# Patient Record
Sex: FEMALE | Race: WHITE | Employment: FULL TIME | URBAN - METROPOLITAN AREA
[De-identification: names, ages, dates, MRNs, and addresses within clinical notes are randomized per-mention and may not be internally consistent; named-entity substitution may affect disease eponyms.]

---

## 2017-05-28 ENCOUNTER — HOSPITAL ENCOUNTER (OUTPATIENT)
Age: 52
Setting detail: OBSERVATION
Discharge: HOME | End: 2017-05-30
Attending: EMERGENCY MEDICINE | Admitting: INTERNAL MEDICINE
Payer: COMMERCIAL

## 2017-05-28 ENCOUNTER — APPOINTMENT (OUTPATIENT)
Dept: CT IMAGING | Age: 52
End: 2017-05-28
Attending: PHYSICIAN ASSISTANT
Payer: COMMERCIAL

## 2017-05-28 DIAGNOSIS — T50.905A ADVERSE DRUG REACTION, INITIAL ENCOUNTER: ICD-10-CM

## 2017-05-28 DIAGNOSIS — N30.01 ACUTE CYSTITIS WITH HEMATURIA: ICD-10-CM

## 2017-05-28 DIAGNOSIS — F41.1 ANXIETY STATE: ICD-10-CM

## 2017-05-28 DIAGNOSIS — I95.9 HYPOTENSION, UNSPECIFIED HYPOTENSION TYPE: Primary | ICD-10-CM

## 2017-05-28 DIAGNOSIS — R10.84 ABDOMINAL PAIN, GENERALIZED: ICD-10-CM

## 2017-05-28 DIAGNOSIS — R07.89 OTHER CHEST PAIN: ICD-10-CM

## 2017-05-28 PROBLEM — N12 PYELONEPHRITIS: Status: ACTIVE | Noted: 2017-05-28

## 2017-05-28 LAB
ALBUMIN SERPL BCP-MCNC: 2.7 G/DL (ref 3.5–5)
ALBUMIN/GLOB SERPL: 0.7 {RATIO} (ref 1.1–2.2)
ALP SERPL-CCNC: 65 U/L (ref 45–117)
ALT SERPL-CCNC: 21 U/L (ref 12–78)
ANION GAP BLD CALC-SCNC: 10 MMOL/L (ref 5–15)
APPEARANCE UR: ABNORMAL
AST SERPL W P-5'-P-CCNC: 17 U/L (ref 15–37)
BACTERIA URNS QL MICRO: ABNORMAL /HPF
BASOPHILS # BLD AUTO: 0 K/UL (ref 0–0.1)
BASOPHILS # BLD: 0 % (ref 0–1)
BILIRUB SERPL-MCNC: 0.4 MG/DL (ref 0.2–1)
BILIRUB UR QL: NEGATIVE
BUN SERPL-MCNC: 19 MG/DL (ref 6–20)
BUN/CREAT SERPL: 14 (ref 12–20)
CALCIUM SERPL-MCNC: 7.6 MG/DL (ref 8.5–10.1)
CHLORIDE SERPL-SCNC: 101 MMOL/L (ref 97–108)
CK SERPL-CCNC: 22 U/L (ref 26–192)
CO2 SERPL-SCNC: 27 MMOL/L (ref 21–32)
COLOR UR: ABNORMAL
CREAT SERPL-MCNC: 1.36 MG/DL (ref 0.55–1.02)
EOSINOPHIL # BLD: 0 K/UL (ref 0–0.4)
EOSINOPHIL NFR BLD: 0 % (ref 0–7)
EPITH CASTS URNS QL MICRO: ABNORMAL /LPF
ERYTHROCYTE [DISTWIDTH] IN BLOOD BY AUTOMATED COUNT: 13.2 % (ref 11.5–14.5)
GLOBULIN SER CALC-MCNC: 4 G/DL (ref 2–4)
GLUCOSE SERPL-MCNC: 105 MG/DL (ref 65–100)
GLUCOSE UR STRIP.AUTO-MCNC: NEGATIVE MG/DL
HCT VFR BLD AUTO: 34 % (ref 35–47)
HGB BLD-MCNC: 11.4 G/DL (ref 11.5–16)
HGB UR QL STRIP: ABNORMAL
KETONES UR QL STRIP.AUTO: 15 MG/DL
LACTATE BLD-SCNC: 1.7 MMOL/L (ref 0.4–2)
LEUKOCYTE ESTERASE UR QL STRIP.AUTO: ABNORMAL
LYMPHOCYTES # BLD AUTO: 10 % (ref 12–49)
LYMPHOCYTES # BLD: 1.6 K/UL (ref 0.8–3.5)
MCH RBC QN AUTO: 30.9 PG (ref 26–34)
MCHC RBC AUTO-ENTMCNC: 33.5 G/DL (ref 30–36.5)
MCV RBC AUTO: 92.1 FL (ref 80–99)
MONOCYTES # BLD: 1.5 K/UL (ref 0–1)
MONOCYTES NFR BLD AUTO: 9 % (ref 5–13)
MUCOUS THREADS URNS QL MICRO: ABNORMAL /LPF
NEUTS SEG # BLD: 13.3 K/UL (ref 1.8–8)
NEUTS SEG NFR BLD AUTO: 81 % (ref 32–75)
NITRITE UR QL STRIP.AUTO: POSITIVE
PH UR STRIP: 5.5 [PH] (ref 5–8)
PLATELET # BLD AUTO: 262 K/UL (ref 150–400)
POTASSIUM SERPL-SCNC: 3.1 MMOL/L (ref 3.5–5.1)
PROT SERPL-MCNC: 6.7 G/DL (ref 6.4–8.2)
PROT UR STRIP-MCNC: 30 MG/DL
RBC # BLD AUTO: 3.69 M/UL (ref 3.8–5.2)
RBC #/AREA URNS HPF: ABNORMAL /HPF (ref 0–5)
SODIUM SERPL-SCNC: 138 MMOL/L (ref 136–145)
SP GR UR REFRACTOMETRY: 1.02 (ref 1–1.03)
TROPONIN I SERPL-MCNC: <0.04 NG/ML
UA: UC IF INDICATED,UAUC: ABNORMAL
UROBILINOGEN UR QL STRIP.AUTO: 0.2 EU/DL (ref 0.2–1)
WBC # BLD AUTO: 16.4 K/UL (ref 3.6–11)
WBC URNS QL MICRO: >100 /HPF (ref 0–4)

## 2017-05-28 PROCEDURE — 65660000000 HC RM CCU STEPDOWN

## 2017-05-28 PROCEDURE — 74011250636 HC RX REV CODE- 250/636: Performed by: PHYSICIAN ASSISTANT

## 2017-05-28 PROCEDURE — 74011250637 HC RX REV CODE- 250/637: Performed by: INTERNAL MEDICINE

## 2017-05-28 PROCEDURE — 83605 ASSAY OF LACTIC ACID: CPT

## 2017-05-28 PROCEDURE — 96365 THER/PROPH/DIAG IV INF INIT: CPT

## 2017-05-28 PROCEDURE — 96361 HYDRATE IV INFUSION ADD-ON: CPT

## 2017-05-28 PROCEDURE — 74011250636 HC RX REV CODE- 250/636: Performed by: EMERGENCY MEDICINE

## 2017-05-28 PROCEDURE — 87040 BLOOD CULTURE FOR BACTERIA: CPT | Performed by: EMERGENCY MEDICINE

## 2017-05-28 PROCEDURE — 74011000258 HC RX REV CODE- 258: Performed by: PHYSICIAN ASSISTANT

## 2017-05-28 PROCEDURE — 80053 COMPREHEN METABOLIC PANEL: CPT | Performed by: PHYSICIAN ASSISTANT

## 2017-05-28 PROCEDURE — 74011250636 HC RX REV CODE- 250/636: Performed by: INTERNAL MEDICINE

## 2017-05-28 PROCEDURE — 74011636320 HC RX REV CODE- 636/320: Performed by: EMERGENCY MEDICINE

## 2017-05-28 PROCEDURE — 81001 URINALYSIS AUTO W/SCOPE: CPT | Performed by: PHYSICIAN ASSISTANT

## 2017-05-28 PROCEDURE — 96372 THER/PROPH/DIAG INJ SC/IM: CPT

## 2017-05-28 PROCEDURE — 96375 TX/PRO/DX INJ NEW DRUG ADDON: CPT

## 2017-05-28 PROCEDURE — 74177 CT ABD & PELVIS W/CONTRAST: CPT

## 2017-05-28 PROCEDURE — 86757 RICKETTSIA ANTIBODY: CPT | Performed by: PHYSICIAN ASSISTANT

## 2017-05-28 PROCEDURE — 87086 URINE CULTURE/COLONY COUNT: CPT | Performed by: PHYSICIAN ASSISTANT

## 2017-05-28 PROCEDURE — 86618 LYME DISEASE ANTIBODY: CPT | Performed by: PHYSICIAN ASSISTANT

## 2017-05-28 PROCEDURE — 85025 COMPLETE CBC W/AUTO DIFF WBC: CPT | Performed by: PHYSICIAN ASSISTANT

## 2017-05-28 PROCEDURE — 36415 COLL VENOUS BLD VENIPUNCTURE: CPT | Performed by: PHYSICIAN ASSISTANT

## 2017-05-28 PROCEDURE — 93005 ELECTROCARDIOGRAM TRACING: CPT

## 2017-05-28 PROCEDURE — 99218 HC RM OBSERVATION: CPT

## 2017-05-28 PROCEDURE — 99285 EMERGENCY DEPT VISIT HI MDM: CPT

## 2017-05-28 PROCEDURE — 74011636320 HC RX REV CODE- 636/320: Performed by: PHYSICIAN ASSISTANT

## 2017-05-28 PROCEDURE — 84484 ASSAY OF TROPONIN QUANT: CPT | Performed by: PHYSICIAN ASSISTANT

## 2017-05-28 PROCEDURE — 82550 ASSAY OF CK (CPK): CPT | Performed by: PHYSICIAN ASSISTANT

## 2017-05-28 RX ORDER — ONDANSETRON 2 MG/ML
4 INJECTION INTRAMUSCULAR; INTRAVENOUS
Status: DISCONTINUED | OUTPATIENT
Start: 2017-05-28 | End: 2017-05-30 | Stop reason: HOSPADM

## 2017-05-28 RX ORDER — SODIUM CHLORIDE 0.9 % (FLUSH) 0.9 %
5-10 SYRINGE (ML) INJECTION EVERY 8 HOURS
Status: DISCONTINUED | OUTPATIENT
Start: 2017-05-28 | End: 2017-05-30 | Stop reason: HOSPADM

## 2017-05-28 RX ORDER — ONDANSETRON 2 MG/ML
4 INJECTION INTRAMUSCULAR; INTRAVENOUS
Status: COMPLETED | OUTPATIENT
Start: 2017-05-28 | End: 2017-05-28

## 2017-05-28 RX ORDER — SODIUM CHLORIDE 9 MG/ML
125 INJECTION, SOLUTION INTRAVENOUS CONTINUOUS
Status: DISCONTINUED | OUTPATIENT
Start: 2017-05-28 | End: 2017-05-30 | Stop reason: HOSPADM

## 2017-05-28 RX ORDER — DIAZEPAM 5 MG/1
10 TABLET ORAL
Status: DISCONTINUED | OUTPATIENT
Start: 2017-05-28 | End: 2017-05-30 | Stop reason: HOSPADM

## 2017-05-28 RX ORDER — OXYCODONE AND ACETAMINOPHEN 5; 325 MG/1; MG/1
1 TABLET ORAL
Status: DISCONTINUED | OUTPATIENT
Start: 2017-05-28 | End: 2017-05-30 | Stop reason: HOSPADM

## 2017-05-28 RX ORDER — HYDROMORPHONE HYDROCHLORIDE 1 MG/ML
1 INJECTION, SOLUTION INTRAMUSCULAR; INTRAVENOUS; SUBCUTANEOUS
Status: COMPLETED | OUTPATIENT
Start: 2017-05-28 | End: 2017-05-28

## 2017-05-28 RX ORDER — SODIUM CHLORIDE 9 MG/ML
50 INJECTION, SOLUTION INTRAVENOUS
Status: COMPLETED | OUTPATIENT
Start: 2017-05-28 | End: 2017-05-28

## 2017-05-28 RX ORDER — SODIUM CHLORIDE 0.9 % (FLUSH) 0.9 %
5-10 SYRINGE (ML) INJECTION AS NEEDED
Status: DISCONTINUED | OUTPATIENT
Start: 2017-05-28 | End: 2017-05-30 | Stop reason: HOSPADM

## 2017-05-28 RX ORDER — POTASSIUM CHLORIDE 20 MEQ/1
40 TABLET, EXTENDED RELEASE ORAL
Status: COMPLETED | OUTPATIENT
Start: 2017-05-28 | End: 2017-05-28

## 2017-05-28 RX ORDER — ENOXAPARIN SODIUM 100 MG/ML
40 INJECTION SUBCUTANEOUS EVERY 24 HOURS
Status: DISCONTINUED | OUTPATIENT
Start: 2017-05-28 | End: 2017-05-30 | Stop reason: HOSPADM

## 2017-05-28 RX ORDER — ACETAMINOPHEN 325 MG/1
650 TABLET ORAL
Status: DISCONTINUED | OUTPATIENT
Start: 2017-05-28 | End: 2017-05-30 | Stop reason: HOSPADM

## 2017-05-28 RX ORDER — SODIUM CHLORIDE 0.9 % (FLUSH) 0.9 %
10 SYRINGE (ML) INJECTION
Status: COMPLETED | OUTPATIENT
Start: 2017-05-28 | End: 2017-05-28

## 2017-05-28 RX ADMIN — POTASSIUM CHLORIDE 40 MEQ: 20 TABLET, EXTENDED RELEASE ORAL at 20:59

## 2017-05-28 RX ADMIN — DIATRIZOATE MEGLUMINE AND DIATRIZOATE SODIUM 30 ML: 600; 100 SOLUTION ORAL; RECTAL at 13:51

## 2017-05-28 RX ADMIN — Medication 10 ML: at 17:08

## 2017-05-28 RX ADMIN — CEFTRIAXONE 1 G: 1 INJECTION, POWDER, FOR SOLUTION INTRAMUSCULAR; INTRAVENOUS at 16:04

## 2017-05-28 RX ADMIN — IOPAMIDOL 100 ML: 755 INJECTION, SOLUTION INTRAVENOUS at 17:08

## 2017-05-28 RX ADMIN — HYDROMORPHONE HYDROCHLORIDE 1 MG: 1 INJECTION, SOLUTION INTRAMUSCULAR; INTRAVENOUS; SUBCUTANEOUS at 15:06

## 2017-05-28 RX ADMIN — ONDANSETRON HYDROCHLORIDE 4 MG: 2 INJECTION, SOLUTION INTRAMUSCULAR; INTRAVENOUS at 15:04

## 2017-05-28 RX ADMIN — SODIUM CHLORIDE 125 ML/HR: 900 INJECTION, SOLUTION INTRAVENOUS at 20:58

## 2017-05-28 RX ADMIN — Medication 10 ML: at 21:00

## 2017-05-28 RX ADMIN — SODIUM CHLORIDE 50 ML/HR: 900 INJECTION, SOLUTION INTRAVENOUS at 17:08

## 2017-05-28 RX ADMIN — SODIUM CHLORIDE 1000 ML: 900 INJECTION, SOLUTION INTRAVENOUS at 15:00

## 2017-05-28 RX ADMIN — SODIUM CHLORIDE 1000 ML: 900 INJECTION, SOLUTION INTRAVENOUS at 16:03

## 2017-05-28 NOTE — ED NOTES
Assumed care of patient. Patient placed in position of comfort. Call bell in reach. Skin warm and dry. Respirations even and unlabored. In no apparent distress at this time. Presents to the ED via EMS from [facility] Med d/t labile BP and elevated WBCs. Originally evaluated for urinary frequency, generalized body aches and malaise. Diffuse abdominal tenderness with palpation to abdomen. Upon ED arrival IVF bolus infusing, BP stable and pt reports \"feeling better. \" IV Rocephin administered pta. A&O x 4. Family at bedside.

## 2017-05-28 NOTE — ED NOTES
[id number]  Patient c/o mid-sternal chest pain ten minutes after Dilaudid IV administration, describes chest pain as intermittent \"tightness\" rated at 10/10. EKG and cardiac enzymes obtained, placed on cardiac monitor, Dr. Maggy Butt evaluated patient. Patient continued to c/o severe chest tightness, KAREN Bruner and Dr. Maggy Butt aware, patient transferred to ED room 35 for cardiac monitoring. Report given using SBAR to Sue Walker RN.

## 2017-05-28 NOTE — H&P
Hospitalist Admission Note    NAME: Otoniel Munson   :  1965   MRN:  [id number]     Date/Time:  2017 6:08 PM    Patient PCP: None  ________________________________________________________________________    My assessment of this patient's clinical condition and my plan of care is as follows. Assessment / Plan:  UTI, likely pyelonephritis  Hypotension (88/58)  -the patient received rocephin outside the hospital but this is what we will cont  -Start ivf and FU blood and urine cx  -hold home BP med  ARF vs CKD: decreased GFR  -no prior lab values, follow BMP and urine output  Anemia  -check iron studies and given the amount she drinks check b12 and folate  Hypokalemia  -replace and check Mag  Alcohol abuse  -start CIWA, she has not ever had a problem she says though not drinking for one night    Code Status:  FULL  DVT Prophylaxis: lovenox           Subjective:   CHIEF COMPLAINT: sent from [facility] urgent care center    HISTORY OF PRESENT ILLNESS:     Otoniel Munson is a 54 y.o.   female who presents with acute onset generalized body aches x 3 days. +fever of 101 yesterday. +assoc nausea and urinary frequency, no dysuria. Pt reports she went to [facility] today for these symptoms. At [facility] pt was diagnosed with an UTI and was transferred to [facility] ER because she was hypotensive. No SOB, no chest pain at home. +lack of appetite. She was taking aleve and advil at home for her symptoms. We were asked to admit for work up and evaluation of the above problems. Past Medical History:   Diagnosis Date    Hypertension         Past Surgical History:   Procedure Laterality Date    HX GI      cholecystectomy    HX GYN      tubal ligation.        Social History   Substance Use Topics    Smoking status: None, former, quit 10 yr ago    Smokeless tobacco: Not on file    Alcohol use  every day 3-4 daily beer and wine       Family Hx:  M - HTN    No Known Allergies     Prior to Admission medications    Medication Sig Start Date End Date Taking? Authorizing Provider   LISINOPRIL PO Take  by mouth. Yes Phys Other, MD       REVIEW OF SYSTEMS:     I am not able to complete the review of systems because: The patient is intubated and sedated    The patient has altered mental status due to his acute medical problems    The patient has baseline aphasia from prior stroke(s)    The patient has baseline dementia and is not reliable historian    The patient is in acute medical distress and unable to provide information           Total of 12 systems reviewed as follows:       POSITIVE= underlined text  Negative = text not underlined  General:  fever, chills, sweats, generalized weakness, weight loss/gain,      loss of appetite   Eyes:    blurred vision, eye pain, loss of vision, double vision  ENT:    rhinorrhea, pharyngitis   Respiratory:   cough, sputum production, SOB, PARKS, wheezing, pleuritic pain   Cardiology:   chest pain, palpitations, orthopnea, PND, edema, syncope   Gastrointestinal:  abdominal pain , N/V, diarrhea, dysphagia, constipation, bleeding   Genitourinary:  frequency, urgency, dysuria, hematuria, incontinence   Muskuloskeletal :  arthralgia, myalgia, back pain  Hematology:  easy bruising, nose or gum bleeding, lymphadenopathy   Dermatological: rash, ulceration, pruritis, color change / jaundice  Endocrine:   hot flashes or polydipsia   Neurological:  headache, dizziness, confusion, focal weakness, paresthesia,     Speech difficulties, memory loss, gait difficulty  Psychological: Feelings of anxiety, depression, agitation    Objective:   VITALS:    Visit Vitals    /63    Pulse 90    Temp 98 °F (36.7 °C)    Resp 18    Ht 5' 7\" (1.702 m)    Wt 71.7 kg (158 lb)    SpO2 92%    BMI 24.75 kg/m2       PHYSICAL EXAM:    General:    Alert, cooperative, no distress, appears stated age.      HEENT: Atraumatic, anicteric sclerae, pink conjunctivae       mucosa moist,   dentition fair  Neck:  Supple, symmetrical,  thyroid: non tender  Lungs:   Clear to auscultation bilaterally. No Wheezing or Rhonchi. No rales. Chest wall:   No Accessory muscle use. +cva tenerness on the L  Heart:   Regular  rhythm,    No edema  Abdomen:   Soft, non-tender. Not distended. Bowel sounds normal  Extremities: No cyanosis. No clubbing,      Skin turgor normal,  Radial dial pulse 2+  Skin:     Not pale. Not Jaundiced    Psych:  Good insight. Not depressed. Not anxious or agitated. Neurologic: EOMs intact. No facial asymmetry. No aphasia or slurred speech. Symmetrical strength, Sensation grossly intact. Alert and oriented X 4.     _______________________________________________________________________  Care Plan discussed with:    Comments   Patient x    Family      RN     Care Manager                    Consultant:      _______________________________________________________________________  Expected  Disposition:   Home with Family x   HH/PT/OT/RN    SNF/LTC    DINESH    ________________________________________________________________________  TOTAL TIME:   Minutes    Critical Care Provided     Minutes non procedure based      Comments     Reviewed previous records   >50% of visit spent in counseling and coordination of care  Discussion with patient and/or family and questions answered       ________________________________________________________________________  Signed: Ovidio Jaimes MD    Procedures: see electronic medical records for all procedures/Xrays and details which were not copied into this note but were reviewed prior to creation of Plan.     LAB DATA REVIEWED:    Recent Results (from the past 24 hour(s))   CBC WITH AUTOMATED DIFF    Collection Time: 05/28/17  1:51 PM   Result Value Ref Range    WBC 16.4 (H) 3.6 - 11.0 K/uL    RBC 3.69 (L) 3.80 - 5.20 M/uL    HGB 11.4 (L) 11.5 - 16.0 g/dL    HCT 34.0 (L) 35.0 - 47.0 %    MCV 92.1 80.0 - 99.0 FL    MCH 30.9 26.0 - 34.0 PG    MCHC 33.5 30.0 - 36.5 g/dL    RDW 13.2 11.5 - 14.5 %    PLATELET 382 699 - 280 K/uL    NEUTROPHILS 81 (H) 32 - 75 %    LYMPHOCYTES 10 (L) 12 - 49 %    MONOCYTES 9 5 - 13 %    EOSINOPHILS 0 0 - 7 %    BASOPHILS 0 0 - 1 %    ABS. NEUTROPHILS 13.3 (H) 1.8 - 8.0 K/UL    ABS. LYMPHOCYTES 1.6 0.8 - 3.5 K/UL    ABS. MONOCYTES 1.5 (H) 0.0 - 1.0 K/UL    ABS. EOSINOPHILS 0.0 0.0 - 0.4 K/UL    ABS. BASOPHILS 0.0 0.0 - 0.1 K/UL   METABOLIC PANEL, COMPREHENSIVE    Collection Time: 05/28/17  1:51 PM   Result Value Ref Range    Sodium 138 136 - 145 mmol/L    Potassium 3.1 (L) 3.5 - 5.1 mmol/L    Chloride 101 97 - 108 mmol/L    CO2 27 21 - 32 mmol/L    Anion gap 10 5 - 15 mmol/L    Glucose 105 (H) 65 - 100 mg/dL    BUN 19 6 - 20 MG/DL    Creatinine 1.36 (H) 0.55 - 1.02 MG/DL    BUN/Creatinine ratio 14 12 - 20      GFR est AA 50 (L) >60 ml/min/1.73m2    GFR est non-AA 41 (L) >60 ml/min/1.73m2    Calcium 7.6 (L) 8.5 - 10.1 MG/DL    Bilirubin, total 0.4 0.2 - 1.0 MG/DL    ALT (SGPT) 21 12 - 78 U/L    AST (SGOT) 17 15 - 37 U/L    Alk.  phosphatase 65 45 - 117 U/L    Protein, total 6.7 6.4 - 8.2 g/dL    Albumin 2.7 (L) 3.5 - 5.0 g/dL    Globulin 4.0 2.0 - 4.0 g/dL    A-G Ratio 0.7 (L) 1.1 - 2.2     URINALYSIS W/ REFLEX CULTURE    Collection Time: 05/28/17  1:52 PM   Result Value Ref Range    Color YELLOW/STRAW      Appearance CLOUDY (A) CLEAR      Specific gravity 1.020 1.003 - 1.030      pH (UA) 5.5 5.0 - 8.0      Protein 30 (A) NEG mg/dL    Glucose NEGATIVE  NEG mg/dL    Ketone 15 (A) NEG mg/dL    Bilirubin NEGATIVE  NEG      Blood MODERATE (A) NEG      Urobilinogen 0.2 0.2 - 1.0 EU/dL    Nitrites POSITIVE (A) NEG      Leukocyte Esterase LARGE (A) NEG      WBC >100 (H) 0 - 4 /hpf    RBC 10-20 0 - 5 /hpf    Epithelial cells MODERATE (A) FEW /lpf    Bacteria 2+ (A) NEG /hpf    UA:UC IF INDICATED URINE CULTURE ORDERED (A) CNI      Mucus TRACE (A) NEG /lpf   EKG, 12 LEAD, INITIAL    Collection Time: 05/28/17  3:27 PM   Result Value Ref Range    Ventricular Rate 65 BPM    Atrial Rate 65 BPM    P-R Interval 124 ms    QRS Duration 72 ms    Q-T Interval 378 ms    QTC Calculation (Bezet) 393 ms    Calculated P Axis 50 degrees    Calculated R Axis 27 degrees    Calculated T Axis -15 degrees    Diagnosis       Normal sinus rhythm  Nonspecific T wave abnormality  No previous ECGs available     TROPONIN I    Collection Time: 05/28/17  3:35 PM   Result Value Ref Range    Troponin-I, Qt. <0.04 <0.05 ng/mL   CK    Collection Time: 05/28/17  3:35 PM   Result Value Ref Range    CK 22 (L) 26 - 192 U/L   POC LACTIC ACID    Collection Time: 05/28/17  4:09 PM   Result Value Ref Range    Lactic Acid (POC) 1.7 0.4 - 2.0 mmol/L

## 2017-05-28 NOTE — PROGRESS NOTES
TRANSFER - IN REPORT:    Verbal report received from Alyssa Lacy RN(name) on Marjan Gearing  being received from ER(unit) for routine progression of care      Report consisted of patients Situation, Background, Assessment and   Recommendations(SBAR). Information from the following report(s) SBAR, Kardex, Intake/Output, MAR and Recent Results was reviewed with the receiving nurse. Opportunity for questions and clarification was provided. Assessment completed upon patients arrival to unit and care assumed.

## 2017-05-28 NOTE — IP AVS SNAPSHOT
[address] [facility] 
[phone] Patient: Loraine Perez MRN: [id number] :1965 You are allergic to the following No active allergies Recent Documentation Height Weight BMI Smoking Status 1.702 m 71.7 kg 24.75 kg/m2 Never Assessed Unresulted Labs Order Current Status LYME AB, IGM, WITH REFLEX WBLOT In process R RICKETTSII AB IGG W/REFL In process CULTURE, BLOOD Preliminary result Emergency Contacts Name Discharge Info Relation Home Work Mobile Jasvir Kelley  Spouse [3]   [phone] About your hospitalization You were admitted on:  May 28, 2017 You last received care in the:  [facility] 3 RENAL MEDICINE You were discharged on:  May 30, 2017 Unit phone number:  [phone] Why you were hospitalized Your primary diagnosis was:  Not on File Your diagnoses also included:  Pyelonephritis, Hypotension, Etoh Abuse Providers Seen During Your Hospitalizations Provider Role Specialty Primary office phone Ariane Rodriguez MD Attending Provider Emergency Medicine [phone] Alexys Patton MD Attending Provider Emergency Medicine [phone] Rose Grimm MD Attending Provider Internal Medicine [phone] Jeannette Barrow MD Attending Provider Internal Medicine [phone] Your Primary Care Physician (PCP) Primary Care Physician Office Phone Office Fax NONE ** None ** ** None ** Follow-up Information Follow up With Details Comments Contact Info Ricardo Pressley MD Schedule an appointment as soon as possible for a visit to be seen as soon as possible [address] [place], [place] [address] [place] [address] 
[phone] Current Discharge Medication List  
  
START taking these medications Dose & Instructions Dispensing Information Comments Morning Noon Evening Bedtime ciprofloxacin HCl 250 mg tablet Commonly known as:  CIPRO Your last dose was: Your next dose is:    
   
   
 Dose:  500 mg Take 2 Tabs by mouth every twelve (12) hours. Indications: UTI (noncomplicated) Quantity:  20 Tab Refills:  0 STOP taking these medications LISINOPRIL PO Where to Get Your Medications Information on where to get these meds will be given to you by the nurse or doctor. ! Ask your nurse or doctor about these medications  
  ciprofloxacin HCl 250 mg tablet Discharge Instructions Patient Discharge Instructions Pt Name  Alondra Leal Date of Birth 1965 Age  54 y.o. Medical Record Number  [id number] PCP None Admit date:  5/28/2017 @    [place] [address] Room Number  5535/08 Date of Discharge 5/30/2017 Admission Diagnoses:     <principal problem not specified> No Known Allergies You were admitted to [address] for  <principal problem not specified> YOUR OTHER MEDICAL DIAGNOSES INCLUDE (BUT NOT LIMITED TO ): 
Present on Admission:  Pyelonephritis  Hypotension  ETOH abuse DIET:  Regular Diet Recommended activity: Activity as tolerated Follow up : Follow-up Information Follow up With Details Comments Contact Info Radha Cordero MD Schedule an appointment as soon as possible for a visit to be seen as soon as possible [address] [place] [address] 
[phone] Please follow up with your urologist as soon as possible. · It is important that you take the medication exactly as they are prescribed. · Keep your medication in the bottles provided by the pharmacist and keep a list of the medication names, dosages, and times to be taken in your wallet. · Do not take other medications without consulting your doctor. ADDITIONAL INFORMATION: If you experience any of the following symptoms or have any health problem not listed below, then please call your primary care physician or return to the emergency room if you cannot get hold of your doctor: Fever, chills, nausea, vomiting, diarrhea, change in mentation, falling, bleeding, shortness of breath. I understand that if any problems occur once I am discharged, I am supposed to call my Primary care physician for further care or seek help in the Emergency Department at the nearest Healthcare facility. I have had an opportunity to discuss my clinical issues with my doctor and nursing staff. I understand and acknowledge receipt of the above instructions. Physician's or R.N.'s Signature                                                            Date/Time Patient or Representative Signature                                                 Date/Time Kidney Infection: Care Instructions Your Care Instructions A kidney infection (pyelonephritis) is a type of urinary tract infection, or UTI. Most UTIs are bladder infections. Kidney infections tend to make people much sicker than bladder infections do. A kidney infection is also more serious because it can cause lasting damage if it is not treated quickly. Follow-up care is a key part of your treatment and safety. Be sure to make and go to all appointments, and call your doctor if you are having problems. It's also a good idea to know your test results and keep a list of the medicines you take. How can you care for yourself at home? · Take your antibiotics as directed.  Do not stop taking them just because you feel better. You need to take the full course of antibiotics. · Drink plenty of water, enough so that your urine is light yellow or clear like water. This may help wash out bacteria that are causing the infection. If you have kidney, heart, or liver disease and have to limit fluids, talk with your doctor before you increase the amount of fluids you drink. · Urinate often. Try to empty your bladder each time. · To relieve pain, take a hot shower or lay a heating pad (set on low) over your lower belly. Never go to sleep with a heating pad in place. Put a thin cloth between the heating pad and your skin. To help prevent kidney infections · Drink plenty of water each day. This helps you urinate often, which clears bacteria from your system. If you have kidney, heart, or liver disease and have to limit fluids, talk with your doctor before you increase the amount of fluids you drink. · Urinate when you have the urge. Do not hold your urine for a long time. Urinate before you go to sleep. · If you have symptoms of a bladder infection, such as burning when you urinate or having to urinate often, call your doctor so you can treat the problem before it gets worse. If you do not treat a bladder infection quickly, it can spread to the kidney. · Men should keep the tip of the penis clean. If you are a woman, keep these ideas in mind: · Urinate right after you have sex. · Change sanitary pads often. Avoid douches, feminine hygiene sprays, and other feminine hygiene products that have deodorants. · After going to the bathroom, wipe from front to back. When should you call for help? Call your doctor now or seek immediate medical care if: 
· You have increasing pain in your back just below the rib cage. This is called flank pain. · You have a new or higher fever and chills. · You are vomiting or nauseated. Watch closely for changes in your health, and be sure to contact your doctor if: · Symptoms, such as burning when you urinate, get worse or get better but then come back. · You are not getting better after 2 days. Where can you learn more? Go to http://roshni-shlomo.info/. Enter Z706 in the search box to learn more about \"Kidney Infection: Care Instructions. \" Current as of: November 28, 2016 Content Version: 11.2 © 5240-3802 iZ3D. Care instructions adapted under license by [employer] (which disclaims liability or warranty for this information). If you have questions about a medical condition or this instruction, always ask your healthcare professional. [place] [address] any warranty or liability for your use of this information. Discharge Orders None Derby[employer]pot Announcement We are excited to announce that we are making your provider's discharge notes available to you in DerbyJackpot. You will see these notes when they are completed and signed by the physician that discharged you from your recent hospital stay. If you have any questions or concerns about any information you see in DerbyJackpot, please call the Health Information Department where you were seen or reach out to your Primary Care Provider for more information about your plan of care. Introducing [facility]! Garth Kasandrarory introduces DerbyJackpot patient portal. Now you can access parts of your medical record, email your doctor's office, and request medication refills online. 1. In your internet browser, go to [email or web address] 2. Click on the First Time User? Click Here link in the Sign In box. You will see the New Member Sign Up page. 3. Enter your DerbyJackpot Access Code exactly as it appears below. You will not need to use this code after youve completed the sign-up process. If you do not sign up before the expiration date, you must request a new code. · DerbyJackpot Access Code: [email or web address] Expires: 8/27/2017 12:52 PM 
 
4. Enter the last four digits of your Social Security Number (xxxx) and Date of Birth (mm/dd/yyyy) as indicated and click Submit. You will be taken to the next sign-up page. 5. Create a Planana ID. This will be your Planana login ID and cannot be changed, so think of one that is secure and easy to remember. 6. Create a Planana password. You can change your password at any time. 7. Enter your Password Reset Question and Answer. This can be used at a later time if you forget your password. 8. Enter your e-mail address. You will receive e-mail notification when new information is available in [address] 9. Click Sign Up. You can now view and download portions of your medical record. 10. Click the Download Summary menu link to download a portable copy of your medical information. If you have questions, please visit the Frequently Asked Questions section of the Planana website. Remember, Planana is NOT to be used for urgent needs. For medical emergencies, dial 911. Now available from your iPhone and Android! General Information Please provide this summary of care documentation to your next provider. Patient Signature:  ____________________________________________________________ Date:  ____________________________________________________________  
  
Enma Garcia Provider Signature:  ____________________________________________________________ Date:  ____________________________________________________________

## 2017-05-28 NOTE — ED NOTES
Received a call from [address] [place] [address]. [facility] states that patient had chest pain and was refusing to move onto the CT table. Compa cazares.

## 2017-05-28 NOTE — ED PROVIDER NOTES
HPI Comments: Alondra Leal is a 54 y.o. female with significant PMHx of hypertension, tubal ligation, and cholecystectomy, presents via EMS to the ED with c/o acute onset generalized body aches x 3 days. Pt notes associated symptoms of fever of \"101\" yesterday, nausea, and urinary frequency. Pt reports she went to [facility] today for these symptoms. At [facility] pt was diagnosed with an UTI and was transferred to [facility] ER because she was hypotensive. Pt notes while she was at [facility] she had lab work done with findings of WBC of 16, Potassium of 3.2, Flu A/B negative, urine showed small amount of blood, and a normal chest/abdomen xray. Per EMS pt was afebrile, received IVF bolus, and Rocephin PTA. EMS reports pt is scheduled for surgery this week. Pt states she is currently not in any pain at this time, per family pt had 2 Advils today 0800. She notes tobacco use and occasional alcohol use. Pt denies rash, vomiting, diarrhea, dysuria, abdominal pain, back pain, vaginal pain, recent sick contact, cough, illicit drug use, or taking her blood pressure medication today. PCP: None    There are no other complaints, changes or physical findings at this time. Written by Arvin Huang ED Scribanaly, as dictated by DONNA Bautista      The history is provided by the patient, the EMS personnel and a relative. No  was used. Past Medical History:   Diagnosis Date    Hypertension        Past Surgical History:   Procedure Laterality Date    HX GI      cholecystectomy    HX GYN      tubal ligation. History reviewed. No pertinent family history. Social History     Social History    Marital status:      Spouse name: N/A    Number of children: N/A    Years of education: N/A     Occupational History    Not on file.      Social History Main Topics    Smoking status: Not on file    Smokeless tobacco: Not on file    Alcohol use Not on file    Drug use: Not on file  Sexual activity: Not on file     Other Topics Concern    Not on file     Social History Narrative    No narrative on file         ALLERGIES: Review of patient's allergies indicates not on file. Review of Systems   Constitutional: Positive for fever (\"101\"). Negative for activity change, appetite change, chills, fatigue and unexpected weight change. HENT: Negative. Negative for congestion, hearing loss, rhinorrhea, sneezing and voice change. Eyes: Negative. Negative for pain and visual disturbance. Respiratory: Negative. Negative for apnea, cough, choking, chest tightness and shortness of breath. Cardiovascular: Negative. Negative for chest pain and palpitations. Gastrointestinal: Positive for nausea. Negative for abdominal distention, abdominal pain, blood in stool, diarrhea and vomiting. Genitourinary: Positive for frequency. Negative for difficulty urinating, flank pain, urgency and vaginal pain. No discharge   Musculoskeletal: Positive for myalgias (generalized body aches). Negative for arthralgias, back pain and neck stiffness. Skin: Negative. Negative for color change and rash. Neurological: Negative. Negative for dizziness, seizures, syncope, speech difficulty, weakness, numbness and headaches. Hematological: Negative for adenopathy. Psychiatric/Behavioral: Negative. Negative for agitation, behavioral problems, dysphoric mood and suicidal ideas. The patient is not nervous/anxious. Patient Vitals for the past 12 hrs:   Temp Pulse Resp BP SpO2   05/28/17 1715 - 90 18 106/63 92 %   05/28/17 1600 - 88 16 104/70 100 %   05/28/17 1541 - 65 14 (!) 88/58 100 %   05/28/17 1324 98 °F (36.7 °C) 85 11 130/63 98 %       Physical Exam   Constitutional: She is oriented to person, place, and time. She appears well-developed and well-nourished. No distress. HENT:   Head: Normocephalic and atraumatic.    Right Ear: External ear normal.   Left Ear: External ear normal.   Nose: Nose normal.   Mouth/Throat: Oropharynx is clear and moist. No oropharyngeal exudate. Eyes: Conjunctivae and EOM are normal. Pupils are equal, round, and reactive to light. Right eye exhibits no discharge. Left eye exhibits no discharge. No scleral icterus. Neck: Normal range of motion. Neck supple. No JVD present. No tracheal deviation present. Cardiovascular: Normal rate, regular rhythm, normal heart sounds and intact distal pulses. Exam reveals no gallop and no friction rub. No murmur heard. Pulmonary/Chest: Effort normal and breath sounds normal. No respiratory distress. She has no wheezes. She has no rales. She exhibits no tenderness. Abdominal: Soft. Bowel sounds are normal. She exhibits no distension and no mass. There is tenderness (diffuse). There is no rigidity, no rebound and no guarding. Musculoskeletal: Normal range of motion. She exhibits no edema or tenderness. Lymphadenopathy:     She has no cervical adenopathy. Neurological: She is alert and oriented to person, place, and time. She has normal reflexes. No cranial nerve deficit. She exhibits normal muscle tone. Coordination normal.   Skin: Skin is warm and dry. She is not diaphoretic. Psychiatric: She has a normal mood and affect. Her behavior is normal. Judgment and thought content normal.   Nursing note and vitals reviewed. MDM  Number of Diagnoses or Management Options  Abdominal pain, generalized:   Acute cystitis with hematuria: Adverse drug reaction, initial encounter:   Anxiety state:   Hypotension, unspecified hypotension type:    Other chest pain:   Diagnosis management comments:       DDx: appendicitis, UTI, pyelonephritis         Amount and/or Complexity of Data Reviewed  Clinical lab tests: ordered and reviewed  Tests in the radiology section of CPT®: ordered and reviewed  Obtain history from someone other than the patient: yes (EMS, Family)  Discuss the patient with other providers: yes (Hospitalist )    Critical Care  Total time providing critical care:  minutes    ED Course       Procedures    Progress Note:  1:19 PM  Family requested pt be checked for lyme and [facility] because she is outside all the time. Written by Lorre Severin, ED Scribe, as dictated by Destini Gomes. Progress Note:  1:20 PM  Pt was offered pain medications, however she declined. Written by Lorre Severin, ED Scribe, as dictated by Destini Gomes. Progress Note:  3:25 PM  Pt reports she has chest pain. Will obtain EKG. EKG interpretation: (Preliminary) 1527  Rhythm: normal sinus rhythm; and regular . Rate (approx.): 65; Axis: normal; WV interval: normal; QRS interval: normal ; ST/T wave: non-specific T wave changes; Other findings: normal.  Written by Lorre Severin, ED Scribe, as dictated by Destini Gomes. Progress Note:  4:15 PM  Nurse informed pt's lactic was 1.7. Pt has a negative troponin. Written by Lorre Severin, ED Scribe, as dictated by Destini Gomes. Progress Note:  4:45 PM  Tiago Hall MD is taking over care for this pt at this time. Written by Lorre Severin, ED Scribe, as dictated by Destini Gomes. CRITICAL CARE NOTE :    4:50 PM      IMPENDING DETERIORATION -Cardiovascular, CNS and Renal    ASSOCIATED RISK FACTORS - Hypotension and Shock    MANAGEMENT- Bedside Assessment and Supervision of Care    INTERPRETATION -  Xrays, CT Scan, ECG and Blood Pressure    INTERVENTIONS - hemodynamic mngmt    CASE REVIEW - Nursing and Family    TREATMENT RESPONSE -Stable    PERFORMED BY - Self and Attending Physician         NOTES   :      I have spent 75 minutes of critical care time involved in lab review, consultations with specialist, family decision- making, bedside attention and documentation. During this entire length of time I was immediately available to the patient .     DONNA Singletary      This note is prepared by [employer] Reese, acting as Scribe for [employer] DONNA England : The scribe's documentation has been prepared under my direction and personally reviewed by me in its entirety. I confirm that the note above accurately reflects all work, treatment, procedures, and medical decision making performed by me.    5:29 PM  Given  UTI and L sided pyelonephritis in septic pt with only 1 kidney who now had a bumped creatinine, will admit for IV abx. Written by Jorge Grady ED Scribe as dictated by Zoe Ruffin. Amy Slade MD    CONSULT NOTE:   5:36 PM  Zoe Ruffin. Amy Slade MD spoke with Dr. John Salas,   Specialty: Hospitalist  Discussed pt's hx, disposition, and available diagnostic and imaging results. Reviewed care plans. Consultant will admit. Written by Jorge Grady, ED Scribe, as dictated by Zoe Ruffin. Amy Slade MD.    LABORATORY TESTS:  Recent Results (from the past 12 hour(s))   CBC WITH AUTOMATED DIFF    Collection Time: 05/28/17  1:51 PM   Result Value Ref Range    WBC 16.4 (H) 3.6 - 11.0 K/uL    RBC 3.69 (L) 3.80 - 5.20 M/uL    HGB 11.4 (L) 11.5 - 16.0 g/dL    HCT 34.0 (L) 35.0 - 47.0 %    MCV 92.1 80.0 - 99.0 FL    MCH 30.9 26.0 - 34.0 PG    MCHC 33.5 30.0 - 36.5 g/dL    RDW 13.2 11.5 - 14.5 %    PLATELET 998 309 - 106 K/uL    NEUTROPHILS 81 (H) 32 - 75 %    LYMPHOCYTES 10 (L) 12 - 49 %    MONOCYTES 9 5 - 13 %    EOSINOPHILS 0 0 - 7 %    BASOPHILS 0 0 - 1 %    ABS. NEUTROPHILS 13.3 (H) 1.8 - 8.0 K/UL    ABS. LYMPHOCYTES 1.6 0.8 - 3.5 K/UL    ABS. MONOCYTES 1.5 (H) 0.0 - 1.0 K/UL    ABS. EOSINOPHILS 0.0 0.0 - 0.4 K/UL    ABS.  BASOPHILS 0.0 0.0 - 0.1 K/UL   METABOLIC PANEL, COMPREHENSIVE    Collection Time: 05/28/17  1:51 PM   Result Value Ref Range    Sodium 138 136 - 145 mmol/L    Potassium 3.1 (L) 3.5 - 5.1 mmol/L    Chloride 101 97 - 108 mmol/L    CO2 27 21 - 32 mmol/L    Anion gap 10 5 - 15 mmol/L    Glucose 105 (H) 65 - 100 mg/dL    BUN 19 6 - 20 MG/DL    Creatinine 1.36 (H) 0.55 - 1.02 MG/DL    BUN/Creatinine ratio 14 12 - 20      GFR est AA 50 (L) >60 ml/min/1.73m2    GFR est non-AA 41 (L) >60 ml/min/1.73m2    Calcium 7.6 (L) 8.5 - 10.1 MG/DL    Bilirubin, total 0.4 0.2 - 1.0 MG/DL    ALT (SGPT) 21 12 - 78 U/L    AST (SGOT) 17 15 - 37 U/L    Alk.  phosphatase 65 45 - 117 U/L    Protein, total 6.7 6.4 - 8.2 g/dL    Albumin 2.7 (L) 3.5 - 5.0 g/dL    Globulin 4.0 2.0 - 4.0 g/dL    A-G Ratio 0.7 (L) 1.1 - 2.2     URINALYSIS W/ REFLEX CULTURE    Collection Time: 05/28/17  1:52 PM   Result Value Ref Range    Color YELLOW/STRAW      Appearance CLOUDY (A) CLEAR      Specific gravity 1.020 1.003 - 1.030      pH (UA) 5.5 5.0 - 8.0      Protein 30 (A) NEG mg/dL    Glucose NEGATIVE  NEG mg/dL    Ketone 15 (A) NEG mg/dL    Bilirubin NEGATIVE  NEG      Blood MODERATE (A) NEG      Urobilinogen 0.2 0.2 - 1.0 EU/dL    Nitrites POSITIVE (A) NEG      Leukocyte Esterase LARGE (A) NEG      WBC >100 (H) 0 - 4 /hpf    RBC 10-20 0 - 5 /hpf    Epithelial cells MODERATE (A) FEW /lpf    Bacteria 2+ (A) NEG /hpf    UA:UC IF INDICATED URINE CULTURE ORDERED (A) CNI      Mucus TRACE (A) NEG /lpf   EKG, 12 LEAD, INITIAL    Collection Time: 05/28/17  3:27 PM   Result Value Ref Range    Ventricular Rate 65 BPM    Atrial Rate 65 BPM    P-R Interval 124 ms    QRS Duration 72 ms    Q-T Interval 378 ms    QTC Calculation (Bezet) 393 ms    Calculated P Axis 50 degrees    Calculated R Axis 27 degrees    Calculated T Axis -15 degrees    Diagnosis       Normal sinus rhythm  Nonspecific T wave abnormality  No previous ECGs available     TROPONIN I    Collection Time: 05/28/17  3:35 PM   Result Value Ref Range    Troponin-I, Qt. <0.04 <0.05 ng/mL   CK    Collection Time: 05/28/17  3:35 PM   Result Value Ref Range    CK 22 (L) 26 - 192 U/L   POC LACTIC ACID    Collection Time: 05/28/17  4:09 PM   Result Value Ref Range    Lactic Acid (POC) 1.7 0.4 - 2.0 mmol/L       IMAGING RESULTS:  CT Results  (Last 48 hours)               05/28/17 0772 CT ABD PELV W CONT Final result    Impression:  IMPRESSION:    1. Possible left-sided pyelonephritis   2. Right renal atrophy. Narrative:  EXAM:  CT ABDOMEN PELVIS WITH CONTRAST   INDICATION:  sent from [facility] abd. pain elevate wbcs   COMPARISON: None. .   TECHNIQUE:    Multislice helical CT was performed from the diaphragm to the symphysis pubis   with oral and intravenous contrast administration. Contiguous 5 mm axial images   were reconstructed and lung and soft tissue windows were generated. Coronal and   sagittal reformations were generated. CT dose reduction was achieved through use of a standardized protocol tailored   for this examination and automatic exposure control for dose modulation. Canary Malady FINDINGS:   INCIDENTALLY IMAGED CHEST:   Heart/vessels: Within normal limits. Lungs/Pleura: Small, 0.5 cm groundglass nodule in the left lung base. .   ABDOMEN:   Liver: Within normal limits. Gallbladder/Biliary: Status post cholecystectomy. Spleen: Within normal limits. Pancreas: Within normal limits. Adrenals: Within normal limits. Kidneys: Right renal atrophy. Punctate calculus in the lower pole of the right   kidney. Small areas of more normal-appearing enhancement medially in the right   kidney are most suggestive of normal preserve parenchyma. Delayed and abnormal   enhancement in the superior and lateral aspect of the left kidney. Peritoneum/Mesenteries: Within normal limits. Extraperitoneum: Within normal limits. Gastrointestinal tract: Within normal limits. Normal-appearing appendix. Vascular: Within normal limits. Canary Malady PELVIS:   Extraperitoneum: Within normal limits. Ureters: Enhancement in the wall of the left ureter. Bladder: Within normal limits. Reproductive System: Within normal limits. Tubal ligation clips. .   MSK:    Degenerative disc disease at L5/S1. Canary Malady              MEDICATIONS GIVEN:  Medications   diatrizoate meglumine-d.sodium (MD-GASTROVIEW,GASTROGRAFIN) 66-10 % contrast solution 30 mL (30 mL Oral Given 5/28/17 1351)   sodium chloride 0.9 % bolus infusion 1,000 mL (0 mL IntraVENous IV Completed 5/28/17 1605)   HYDROmorphone (PF) (DILAUDID) injection 1 mg (1 mg IntraMUSCular Given 5/28/17 1506)   ondansetron (ZOFRAN) injection 4 mg (4 mg IntraVENous Given 5/28/17 1504)   sodium chloride 0.9 % bolus infusion 1,000 mL (0 mL IntraVENous IV Completed 5/28/17 1659)   cefTRIAXone (ROCEPHIN) 1 g in 0.9% sodium chloride (MBP/ADV) 50 mL (0 g IntraVENous IV Completed 5/28/17 1634)   0.9% sodium chloride infusion (50 mL/hr IntraVENous New Bag 5/28/17 1708)   iopamidol (ISOVUE-370) 76 % injection 100 mL (100 mL IntraVENous Given 5/28/17 1708)   sodium chloride (NS) flush 10 mL (10 mL IntraVENous Given 5/28/17 1708)       IMPRESSION:  1. Hypotension, unspecified hypotension type    2. Acute cystitis with hematuria    3. Abdominal pain, generalized    4. Other chest pain    5. Adverse drug reaction, initial encounter    6. Anxiety state        PLAN: Admit to Hospitalist  5:37 PM  Patient is being admitted to the hospital.  The results of their tests and reasons for their admission have been discussed with them and/or available family. They convey agreement and understanding for the need to be admitted and for their admission diagnosis. Consultation has been made with the inpatient physician specialist for hospitalization. This note is prepared by Emiliano Stubbs acting as scribe for [employer] Mariana Wynn MD : The scribe's documentation has been prepared under my direction and personally reviewed by me in its entirety. I confirm that the note above accurately reflects all work, treatment, procedures, and medical decision making performed by me.

## 2017-05-28 NOTE — ROUTINE PROCESS
TRANSFER - OUT REPORT:    Verbal report given to [facility], RN (name) on Vic Gomez  being transferred to [facility](unit) for routine progression of care       Report consisted of patients Situation, Background, Assessment and   Recommendations(SBAR). Information from the following report(s) SBAR, Kardex and ED Summary was reviewed with the receiving nurse. Lines:   Peripheral IV 05/28/17 Right Antecubital (Active)   Site Assessment Clean, dry, & intact 5/28/2017  1:25 PM   Phlebitis Assessment 0 5/28/2017  1:25 PM   Infiltration Assessment 0 5/28/2017  1:25 PM   Dressing Status Clean, dry, & intact 5/28/2017  1:25 PM        Opportunity for questions and clarification was provided.       Patient transported with:   "[employer]

## 2017-05-29 LAB
ANION GAP BLD CALC-SCNC: 10 MMOL/L (ref 5–15)
ATRIAL RATE: 65 BPM
BUN SERPL-MCNC: 12 MG/DL (ref 6–20)
BUN/CREAT SERPL: 12 (ref 12–20)
C DIFF TOX GENS STL QL NAA+PROBE: NEGATIVE
CALCIUM SERPL-MCNC: 7.3 MG/DL (ref 8.5–10.1)
CALCULATED P AXIS, ECG09: 50 DEGREES
CALCULATED R AXIS, ECG10: 27 DEGREES
CALCULATED T AXIS, ECG11: -15 DEGREES
CHLORIDE SERPL-SCNC: 107 MMOL/L (ref 97–108)
CO2 SERPL-SCNC: 22 MMOL/L (ref 21–32)
CREAT SERPL-MCNC: 0.99 MG/DL (ref 0.55–1.02)
DIAGNOSIS, 93000: NORMAL
ERYTHROCYTE [DISTWIDTH] IN BLOOD BY AUTOMATED COUNT: 13.6 % (ref 11.5–14.5)
FERRITIN SERPL-MCNC: 357 NG/ML (ref 8–252)
FOLATE SERPL-MCNC: 11.7 NG/ML (ref 5–21)
GLUCOSE SERPL-MCNC: 100 MG/DL (ref 65–100)
HCT VFR BLD AUTO: 33.8 % (ref 35–47)
HGB BLD-MCNC: 11.4 G/DL (ref 11.5–16)
IRON SATN MFR SERPL: 6 % (ref 20–50)
IRON SERPL-MCNC: 10 UG/DL (ref 35–150)
MAGNESIUM SERPL-MCNC: 2.4 MG/DL (ref 1.6–2.4)
MCH RBC QN AUTO: 31.3 PG (ref 26–34)
MCHC RBC AUTO-ENTMCNC: 33.7 G/DL (ref 30–36.5)
MCV RBC AUTO: 92.9 FL (ref 80–99)
P-R INTERVAL, ECG05: 124 MS
PLATELET # BLD AUTO: 260 K/UL (ref 150–400)
POTASSIUM SERPL-SCNC: 3.7 MMOL/L (ref 3.5–5.1)
Q-T INTERVAL, ECG07: 378 MS
QRS DURATION, ECG06: 72 MS
QTC CALCULATION (BEZET), ECG08: 393 MS
RBC # BLD AUTO: 3.64 M/UL (ref 3.8–5.2)
SODIUM SERPL-SCNC: 139 MMOL/L (ref 136–145)
TIBC SERPL-MCNC: 170 UG/DL (ref 250–450)
VENTRICULAR RATE, ECG03: 65 BPM
VIT B12 SERPL-MCNC: 366 PG/ML (ref 211–911)
WBC # BLD AUTO: 11.7 K/UL (ref 3.6–11)

## 2017-05-29 PROCEDURE — 74011250636 HC RX REV CODE- 250/636: Performed by: INTERNAL MEDICINE

## 2017-05-29 PROCEDURE — 82746 ASSAY OF FOLIC ACID SERUM: CPT | Performed by: INTERNAL MEDICINE

## 2017-05-29 PROCEDURE — 87493 C DIFF AMPLIFIED PROBE: CPT

## 2017-05-29 PROCEDURE — 80048 BASIC METABOLIC PNL TOTAL CA: CPT | Performed by: INTERNAL MEDICINE

## 2017-05-29 PROCEDURE — 36415 COLL VENOUS BLD VENIPUNCTURE: CPT | Performed by: INTERNAL MEDICINE

## 2017-05-29 PROCEDURE — 83735 ASSAY OF MAGNESIUM: CPT | Performed by: INTERNAL MEDICINE

## 2017-05-29 PROCEDURE — 65660000000 HC RM CCU STEPDOWN

## 2017-05-29 PROCEDURE — 82728 ASSAY OF FERRITIN: CPT | Performed by: INTERNAL MEDICINE

## 2017-05-29 PROCEDURE — 82607 VITAMIN B-12: CPT | Performed by: INTERNAL MEDICINE

## 2017-05-29 PROCEDURE — 74011250637 HC RX REV CODE- 250/637: Performed by: INTERNAL MEDICINE

## 2017-05-29 PROCEDURE — 83540 ASSAY OF IRON: CPT | Performed by: INTERNAL MEDICINE

## 2017-05-29 PROCEDURE — 96361 HYDRATE IV INFUSION ADD-ON: CPT

## 2017-05-29 PROCEDURE — 99218 HC RM OBSERVATION: CPT

## 2017-05-29 PROCEDURE — 85027 COMPLETE CBC AUTOMATED: CPT | Performed by: INTERNAL MEDICINE

## 2017-05-29 PROCEDURE — 96366 THER/PROPH/DIAG IV INF ADDON: CPT

## 2017-05-29 PROCEDURE — 74011000258 HC RX REV CODE- 258: Performed by: INTERNAL MEDICINE

## 2017-05-29 RX ORDER — DIPHENOXYLATE HYDROCHLORIDE AND ATROPINE SULFATE 2.5; .025 MG/1; MG/1
1 TABLET ORAL
Status: DISCONTINUED | OUTPATIENT
Start: 2017-05-29 | End: 2017-05-30 | Stop reason: HOSPADM

## 2017-05-29 RX ADMIN — Medication 10 ML: at 22:47

## 2017-05-29 RX ADMIN — ACETAMINOPHEN 650 MG: 325 TABLET, FILM COATED ORAL at 12:05

## 2017-05-29 RX ADMIN — SODIUM CHLORIDE 125 ML/HR: 900 INJECTION, SOLUTION INTRAVENOUS at 12:23

## 2017-05-29 RX ADMIN — ACETAMINOPHEN 650 MG: 325 TABLET, FILM COATED ORAL at 02:42

## 2017-05-29 RX ADMIN — DIPHENOXYLATE HYDROCHLORIDE AND ATROPINE SULFATE 1 TABLET: 2.5; .025 TABLET ORAL at 17:03

## 2017-05-29 RX ADMIN — Medication 10 ML: at 15:10

## 2017-05-29 RX ADMIN — SODIUM CHLORIDE 125 ML/HR: 900 INJECTION, SOLUTION INTRAVENOUS at 04:09

## 2017-05-29 RX ADMIN — CEFTRIAXONE 1 G: 1 INJECTION, POWDER, FOR SOLUTION INTRAMUSCULAR; INTRAVENOUS at 17:04

## 2017-05-29 RX ADMIN — Medication 10 ML: at 06:54

## 2017-05-29 RX ADMIN — SODIUM CHLORIDE 125 ML/HR: 900 INJECTION, SOLUTION INTRAVENOUS at 22:46

## 2017-05-29 NOTE — PROGRESS NOTES
Patient confirmed that she does not currently have a PCP, due to just moved to the area. I offered to give Mrs. Gerald Diaz a list of PCP's to choose from and she declined. Mrs. Gerald Diaz stated that she would find her own PCP and make her own follow-up appointment.

## 2017-05-29 NOTE — PROGRESS NOTES
Primary Nurse Marina Lim RN and Juan Miguel Coleman RN performed a dual skin assessment on this patient No impairment noted  Lucas score is 22

## 2017-05-29 NOTE — PROGRESS NOTES
Pt admitted with pyelonephritis sepsis from [facility] urgent care. Pt lives with spouse in [place], full code, [address] [facility], independent with adls, BP cuff, and anticipates d/c tomorrow if cultures are negative. No d/c needs assessed. Care Management Interventions  PCP Verified by CM:  Yes  Mode of Transport at Discharge: BLS  MyChart Signup: No  Discharge Durable Medical Equipment: Yes  Health Maintenance Reviewed: Yes  Physical Therapy Consult: No  Occupational Therapy Consult: No  Speech Therapy Consult: No  Current Support Network: Lives with Spouse  Confirm Follow Up Transport: Family  Plan discussed with Pt/Family/Caregiver: Yes  Freedom of Choice Offered: Yes  Discharge Location  Discharge Placement: Home

## 2017-05-29 NOTE — PROGRESS NOTES
Hospitalist Progress Note    NAME: Vic Gomez   :  1965   MRN:  [id number]       Interim Hospital Summary: 54 y.o. female whom presented on 2017 with      Assessment / Plan:  Sepsis, per CT report: possible left pyelonephritis and renal atrophy. Hypotension and poor po intake.  -the patient received rocephin outside the hospital that was continued. -per admission note: wanted blood and urine culture but appears only urine culture collected.  -already on ceftriaxone and  urine culture no growth thus far.  -hold home BP med due to hypotension  -currently afebrile and leukocytosis trending down.  -pt states that she brought in records from outside but unable to locate  -Ordered urology consult    Diarrhea,  c.diff results pending    NENA, resolved with IVF hydration and antibiotics     Anemia  -h/h stable and no s/s of acute blood loss    Hypokalemia, resolved with replacement  Continue to trend     Alcohol abuse per admission note  -start CIWA, she has not ever had a problem she says though not drinking for one night.  -no withdrawal symptoms reported     Code Status: FULL  DVT Prophylaxis: lovenox    DISPO: f/u with Urology     Subjective:     Chief Complaint / Reason for Physician Visit  Patient states she feels better and the services on the 3rd floor (her current location has been better). Review of Systems:  Symptom Y/N Comments  Symptom Y/N Comments   Fever/Chills n   Chest Pain     Poor Appetite y   Edema     Cough    Abdominal Pain n    Sputum    Joint Pain     SOB/PARKS    Pruritis/Rash     Nausea/vomit    Tolerating PT/OT     Diarrhea y   Tolerating Diet     Constipation    Other       Could NOT obtain due to:      Objective:     VITALS:   Last 24hrs VS reviewed since prior progress note.  Most recent are:  Patient Vitals for the past 24 hrs:   Temp Pulse Resp BP SpO2   17 0743 98.2 °F (36.8 °C) 74 18 103/61 98 %   17 0307 98.4 °F (36.9 °C) 86 16 127/64 97 % 05/28/17 2307 98 °F (36.7 °C) 75 16 (!) 89/53 98 %   05/28/17 2123 98.2 °F (36.8 °C) 74 16 101/62 98 %   05/28/17 1715 - 90 18 106/63 92 %   05/28/17 1600 - 88 16 104/70 100 %   05/28/17 1541 - 65 14 (!) 88/58 100 %   05/28/17 1324 98 °F (36.7 °C) 85 11 130/63 98 %       Intake/Output Summary (Last 24 hours) at 05/29/17 1001  Last data filed at 05/29/17 0941   Gross per 24 hour   Intake              240 ml   Output              600 ml   Net             -360 ml        PHYSICAL EXAM:  General: WD, WN. Alert, cooperative, no acute distress    EENT:  EOMI. Anicteric sclerae. MMM  Resp:  CTA bilaterally, no wheezing or rales. No accessory muscle use  CV:  Regular  rhythm,  No edema  GI:  Soft, Non distended, Non tender.  +Bowel sounds  Neurologic:  Alert and oriented X 3, normal speech,   Psych:   Good insight. Not anxious nor agitated  Skin:  No rashes. No jaundice    Reviewed most current lab test results and cultures  YES  Reviewed most current radiology test results   YES  Review and summation of old records today    NO  Reviewed patient's current orders and MAR    YES  PMH/ reviewed - no change compared to H&P  ________________________________________________________________________  Care Plan discussed with:    Comments   Patient y    [facility]                        Multidiciplinary team rounds were held today with , nursing, pharmacist and clinical coordinator. Patient's plan of care was discussed; medications were reviewed and discharge planning was addressed.      ________________________________________________________________________  Total NON critical care TIME:  25  Minutes    Total CRITICAL CARE TIME Spent:   Minutes non procedure based      Comments   >50% of visit spent in counseling and coordination of care     ________________________________________________________________________  Judson Chapa MD     Procedures: see electronic medical records for all procedures/Xrays and details which were not copied into this note but were reviewed prior to creation of Plan. LABS:  I reviewed today's most current labs and imaging studies.   Pertinent labs include:  Recent Labs      05/29/17   0304  05/28/17   1351   WBC  11.7*  16.4*   HGB  11.4*  11.4*   HCT  33.8*  34.0*   PLT  260  262     Recent Labs      05/29/17   0304 05/28/17   1351   NA  139  138   K  3.7  3.1*   CL  107  101   CO2  22  27   GLU  100  105*   BUN  12  19   CREA  0.99  1.36*   CA  7.3*  7.6*   MG  2.4   --    ALB   --   2.7*   TBILI   --   0.4   SGOT   --   17   ALT   --   21       Signed: Noy Joshi MD

## 2017-05-29 NOTE — PROGRESS NOTES
Spoke with Dr. Obdulia Castillo from urology and discussed patient. Per urology, recommended to f/u on cultures and if culture is negative then can consider discharge on cipro with a followup with them. Therefore, plan to continue current course of therapy, f/u tomorrow (Tuesday) and if cultures negative then consider discharge on cipro with followup with Dr. Obdulia Castillo or his office.

## 2017-05-29 NOTE — PROGRESS NOTES
Bedside shift change report given to [facility] (oncoming nurse) by Melanee Hammans (offgoing nurse). Report included the following information SBAR, Kardex, Intake/Output, MAR, Recent Results and Cardiac Rhythm . Zoe Saldana Phone for oncoming shift:   [phone]    Shift Summary: Pt given PRN tylenol for headache. LDAs               Peripheral IV 05/28/17 Right Antecubital (Active)   Site Assessment Clean, dry, & intact 5/29/2017  3:35 PM   Phlebitis Assessment 0 5/29/2017  3:35 PM   Infiltration Assessment 0 5/29/2017  3:35 PM   Dressing Status Clean, dry, & intact 5/29/2017  3:35 PM   Dressing Type Transparent;Tape 5/29/2017  3:35 PM   Hub Color/Line Status Blue; Infusing 5/29/2017  3:35 PM   Alcohol Cap Used Yes 5/29/2017  3:35 PM                        Intake & Output   Date 05/28/17 1900 - 05/29/17 0659 05/29/17 0700 - 05/30/17 0659   Shift 2529-5991 24 Hour Total 2158-8975 9333-7441 24 Hour Total   I  N  T  A  K  E   P. O.   720  720      P. O.   720  720    I.V.  (mL/kg/hr)   50  (0.1)  50      Volume (cefTRIAXone (ROCEPHIN) 1 g in 0.9% sodium chloride (MBP/ADV) 50 mL)   50  50    Shift Total  (mL/kg)   770  (10.7)  770  (10.7)   O  U  T  P  U  T   Urine  (mL/kg/hr)   (1.4)  1200      Urine Voided   1200    Stool           Stool Occurrence(s) 3 x 3 x       Shift Total  (mL/kg) 200  (2.8) 200  (2.8) 1200  (16.7)  1200  (16.7)   NET -200 -200 -430  -430   Weight (kg) 71.7 71.7 71.7 71.7 71.7      Last Bowel Movement Last Bowel Movement Date: 05/28/17   Glucose Checks [] N/A  [] AC/HS  [] Q6  Concerns:   Nutrition Active Orders   Diet    DIET GI LITE (POST SURGICAL)       Consults []PT  []OT  []Speech  []Case Management   Cardiac Monitoring []N/A []Yes Expires:

## 2017-05-29 NOTE — PROGRESS NOTES
Bedside and Verbal shift change report given to Nancie Pérez (oncoming nurse) by Tawny Rucker (offgoing nurse). Report included the following information SBAR, Kardex, Intake/Output, MAR and Recent Results. Zone Phone for oncoming shift:   [phone]    Shift Summary: Patient rested quietly throughout the night. No C/O pain voiced.      LDAs               Peripheral IV 05/28/17 Right Antecubital (Active)   Site Assessment Clean, dry, & intact 5/28/2017  8:08 PM   Phlebitis Assessment 0 5/28/2017  8:08 PM   Infiltration Assessment 0 5/28/2017  8:08 PM   Dressing Status Clean, dry, & intact 5/28/2017  8:08 PM   Dressing Type Tape;Transparent 5/28/2017  8:08 PM   Hub Color/Line Status Blue;Flushed 5/28/2017  8:08 PM                        Intake & Output     Last Bowel Movement Last Bowel Movement Date: 05/28/17   Glucose Checks [x] N/A  [] AC/HS  [] Q6  Concerns:   Nutrition Active Orders   Diet    DIET REGULAR       Consults []PT  []OT  []Speech  []Case Management   Cardiac Monitoring []N/A [x]Yes Expires: 48 hours

## 2017-05-29 NOTE — PROGRESS NOTES
Initial Nutrition Assessment:    INTERVENTIONS/RECOMMENDATIONS:   · Continue current diet     ASSESSMENT:   Chart reviewed, medically noted for diarrhea and HTN. Pt reports of poor PO intake due to diarrhea. She notes once she is giving something for diarrhea she will eat something. She was eating well prior to this episode of diarrhea. Past Medical History:   Diagnosis Date    Hypertension        Diet Order: Regular  % Eaten:  Patient Vitals for the past 72 hrs:   % Diet Eaten   05/29/17 0941 0 %     Pertinent Medications: [x]Reviewed []Other (NS)   Pertinent Labs: [x]Reviewed []Other  Food Allergies: [x]NKFA  []Other   Last BM:  5/28 (diarrhea)    Skin:    [x] Intact   [] Incision  [] Breakdown  [] Other: Wt Readings from Last 30 Encounters:   05/28/17 71.7 kg (158 lb)       Anthropometrics:   Height: 5' 7\" (170.2 cm) Weight: 71.7 kg (158 lb)   IBW (%IBW):   ( ) UBW (%UBW):   (  %)   Last Weight Metrics:  Weight Loss Metrics 5/28/2017   Today's Wt 158 lb   BMI 24.75 kg/m2       BMI: Body mass index is 24.75 kg/(m^2). This BMI is indicative of:   []Underweight    [x]Normal    []Overweight    [] Obesity   [] Extreme Obesity (BMI>40)     Estimated Nutrition Needs (Based on):   1800 Kcals/day (MSJ: 1375 x 1.3) , 70 g (1 g/kg) Protein  Carbohydrate:  At Least 130 g/day  Fluids: 1800 mL/day (1ml/kcal) or per primary team    NUTRITION DIAGNOSES:   Problem:  Inadequate oral food/beverage intake      Etiology: related to diarrhea     Signs/Symptoms: as evidenced by pt report of poor PO intake      NUTRITION INTERVENTIONS:  Meals/Snacks: General/healthful diet                  GOAL:   consume >75% of meals in 3-5 days    LEARNING NEEDS (Diet, Food/Nutrient-Drug Interaction):    [x] None Identified   [] Identified and Education Provided/Documented   [] Identified and Pt declined/was not appropriate     Cultureal, Cheondoism, OR Ethnic Dietary Needs:    [x] None Identified   [] Identified and Addressed     [x] Interdisciplinary Care Plan Reviewed/Documented    [x] Discharge Planning: General healthy diet      MONITORING /EVALUATION:      Food/Nutrient Intake Outcomes:  Total energy intake  Physical Signs/Symptoms Outcomes: Weight/weight change, Electrolyte and renal profile, GI, GI profile    NUTRITION RISK:    [] High              [] Moderate           [x]  Low  []  Minimal/Uncompromised    PT SEEN FOR:    []  MD Consult: []Calorie Count      []Diabetic Diet Education        []Diet Education     []Electrolyte Management     []General Nutrition Management and Supplements     []Management of Tube Feeding     []TPN Recommendations    [x]  RN Referral:  [x]MST score >=2     []Enteral/Parenteral Nutrition PTA     []Pregnant: Gestational DM or Multigestation     []Pressure Ulcer/Wound Care needs        []  Low BMI  []  DTR Referral       Lyle Baum RDN  Pager [phone]  Weekend Pager [phone]

## 2017-05-29 NOTE — PROGRESS NOTES
10:40- Urology consult called. 10:45- Received return phone call back from Dr. Emeli Plascencia, urology, spoke to MD regarding reason for consult: sepsis with possible pyelonephritis. Per MD request, read CT results to MD. MD requesting to speak to Dr. Kathryn Caceres, hospitalist regarding consult need. 10:48- Spoke to Dr. Kathryn Caceres- MD to call urology MD.     16:42- Paged Dr. Kathryn Caceres per pt request- pt requesting anti-diarrheal medication.      16:46- Received telephone order for lomotil

## 2017-05-30 VITALS
WEIGHT: 158 LBS | TEMPERATURE: 98.2 F | BODY MASS INDEX: 24.8 KG/M2 | RESPIRATION RATE: 16 BRPM | DIASTOLIC BLOOD PRESSURE: 60 MMHG | SYSTOLIC BLOOD PRESSURE: 109 MMHG | HEART RATE: 86 BPM | HEIGHT: 67 IN | OXYGEN SATURATION: 97 %

## 2017-05-30 PROBLEM — I95.9 HYPOTENSION: Status: ACTIVE | Noted: 2017-05-30

## 2017-05-30 PROBLEM — F10.10 ETOH ABUSE: Status: ACTIVE | Noted: 2017-05-30

## 2017-05-30 LAB
ANION GAP BLD CALC-SCNC: 10 MMOL/L (ref 5–15)
BACTERIA SPEC CULT: NORMAL
BASOPHILS # BLD AUTO: 0 K/UL (ref 0–0.1)
BASOPHILS # BLD: 0 % (ref 0–1)
BUN SERPL-MCNC: 5 MG/DL (ref 6–20)
BUN/CREAT SERPL: 7 (ref 12–20)
CALCIUM SERPL-MCNC: 7.5 MG/DL (ref 8.5–10.1)
CC UR VC: NORMAL
CHLORIDE SERPL-SCNC: 111 MMOL/L (ref 97–108)
CO2 SERPL-SCNC: 22 MMOL/L (ref 21–32)
CREAT SERPL-MCNC: 0.76 MG/DL (ref 0.55–1.02)
EOSINOPHIL # BLD: 0.1 K/UL (ref 0–0.4)
EOSINOPHIL NFR BLD: 1 % (ref 0–7)
ERYTHROCYTE [DISTWIDTH] IN BLOOD BY AUTOMATED COUNT: 13.8 % (ref 11.5–14.5)
GLUCOSE SERPL-MCNC: 88 MG/DL (ref 65–100)
HCT VFR BLD AUTO: 29.6 % (ref 35–47)
HGB BLD-MCNC: 10.1 G/DL (ref 11.5–16)
LYMPHOCYTES # BLD AUTO: 15 % (ref 12–49)
LYMPHOCYTES # BLD: 1.4 K/UL (ref 0.8–3.5)
MCH RBC QN AUTO: 31 PG (ref 26–34)
MCHC RBC AUTO-ENTMCNC: 34.1 G/DL (ref 30–36.5)
MCV RBC AUTO: 90.8 FL (ref 80–99)
MONOCYTES # BLD: 1.1 K/UL (ref 0–1)
MONOCYTES NFR BLD AUTO: 12 % (ref 5–13)
NEUTS SEG # BLD: 6.7 K/UL (ref 1.8–8)
NEUTS SEG NFR BLD AUTO: 72 % (ref 32–75)
PLATELET # BLD AUTO: 287 K/UL (ref 150–400)
POTASSIUM SERPL-SCNC: 3.2 MMOL/L (ref 3.5–5.1)
RBC # BLD AUTO: 3.26 M/UL (ref 3.8–5.2)
SERVICE CMNT-IMP: NORMAL
SODIUM SERPL-SCNC: 143 MMOL/L (ref 136–145)
WBC # BLD AUTO: 9.2 K/UL (ref 3.6–11)

## 2017-05-30 PROCEDURE — 85025 COMPLETE CBC W/AUTO DIFF WBC: CPT | Performed by: INTERNAL MEDICINE

## 2017-05-30 PROCEDURE — 99218 HC RM OBSERVATION: CPT

## 2017-05-30 PROCEDURE — 74011250637 HC RX REV CODE- 250/637: Performed by: NURSE PRACTITIONER

## 2017-05-30 PROCEDURE — 36415 COLL VENOUS BLD VENIPUNCTURE: CPT | Performed by: INTERNAL MEDICINE

## 2017-05-30 PROCEDURE — 80048 BASIC METABOLIC PNL TOTAL CA: CPT | Performed by: INTERNAL MEDICINE

## 2017-05-30 RX ORDER — CIPROFLOXACIN 250 MG/1
500 TABLET, FILM COATED ORAL EVERY 12 HOURS
Qty: 3 TAB | Refills: 0 | Status: SHIPPED | OUTPATIENT
Start: 2017-05-30 | End: 2017-05-30

## 2017-05-30 RX ORDER — CIPROFLOXACIN 250 MG/1
500 TABLET, FILM COATED ORAL EVERY 12 HOURS
Qty: 20 TAB | Refills: 0 | Status: SHIPPED | OUTPATIENT
Start: 2017-05-30

## 2017-05-30 RX ORDER — CIPROFLOXACIN 250 MG/1
500 TABLET, FILM COATED ORAL EVERY 12 HOURS
Qty: 5 TAB | Refills: 0 | Status: SHIPPED | OUTPATIENT
Start: 2017-05-30 | End: 2017-05-30

## 2017-05-30 RX ORDER — POTASSIUM CHLORIDE 750 MG/1
40 TABLET, FILM COATED, EXTENDED RELEASE ORAL
Status: COMPLETED | OUTPATIENT
Start: 2017-05-30 | End: 2017-05-30

## 2017-05-30 RX ADMIN — POTASSIUM CHLORIDE 40 MEQ: 750 TABLET, FILM COATED, EXTENDED RELEASE ORAL at 09:24

## 2017-05-30 RX ADMIN — Medication 10 ML: at 05:14

## 2017-05-30 NOTE — CDMP QUERY
Soni Abdi NP  Please Document Present on Admission Status for - Sepsis, unspecified organism  Thank You, Vaishali Whalen.  [facility] IN THE [facility] [address]; PPR;[phone]

## 2017-05-30 NOTE — DISCHARGE INSTRUCTIONS
Patient Discharge Instructions     Pt Name  Jerry Huber   Date of Birth 1965   Age  54 y.o. Medical Record Number  [id number]   PCP None    Admit date:  5/28/2017 @    [address] [place] [address]    Room Number  5186/59   Date of Discharge 5/30/2017     Admission Diagnoses:     <principal problem not specified>        No Known Allergies     You were admitted to [address] [place] [address] for  <principal problem not specified>    YOUR OTHER MEDICAL DIAGNOSES INCLUDE (BUT NOT LIMITED TO ):  Present on Admission:   Pyelonephritis   Hypotension   ETOH abuse      DIET:  Regular Diet   Recommended activity: Activity as tolerated  Follow up : Follow-up Information     Follow up With Details Comments Contact Info    Jesus Earl MD Schedule an appointment as soon as possible for a visit to be seen as soon as possible [address]  [phone]            Please follow up with your urologist as soon as possible. · It is important that you take the medication exactly as they are prescribed. · Keep your medication in the bottles provided by the pharmacist and keep a list of the medication names, dosages, and times to be taken in your wallet. · Do not take other medications without consulting your doctor. ADDITIONAL INFORMATION: If you experience any of the following symptoms or have any health problem not listed below, then please call your primary care physician or return to the emergency room if you cannot get hold of your doctor: Fever, chills, nausea, vomiting, diarrhea, change in mentation, falling, bleeding, shortness of breath. I understand that if any problems occur once I am discharged, I am supposed to call my Primary care physician for further care or seek help in the Emergency Department at the nearest Healthcare facility. I have had an opportunity to discuss my clinical issues with my doctor and nursing staff.           I understand and acknowledge receipt of the above instructions. [facility] or RALEXANDR's Signature                                                            Date/Time                                                                                                                                              Patient or Representative Signature                                                 Date/Time                     Kidney Infection: Care Instructions  Your Care Instructions  A kidney infection (pyelonephritis) is a type of urinary tract infection, or UTI. Most UTIs are bladder infections. Kidney infections tend to make people much sicker than bladder infections do. A kidney infection is also more serious because it can cause lasting damage if it is not treated quickly. Follow-up care is a key part of your treatment and safety. Be sure to make and go to all appointments, and call your doctor if you are having problems. It's also a good idea to know your test results and keep a list of the medicines you take. How can you care for yourself at home? · Take your antibiotics as directed. Do not stop taking them just because you feel better. You need to take the full course of antibiotics. · Drink plenty of water, enough so that your urine is light yellow or clear like water. This may help wash out bacteria that are causing the infection. If you have kidney, heart, or liver disease and have to limit fluids, talk with your doctor before you increase the amount of fluids you drink. · Urinate often. Try to empty your bladder each time. · To relieve pain, take a hot shower or lay a heating pad (set on low) over your lower belly. Never go to sleep with a heating pad in place. Put a thin cloth between the heating pad and your skin.   To help prevent kidney infections  · Drink plenty of water each day. This helps you urinate often, which clears bacteria from your system. If you have kidney, heart, or liver disease and have to limit fluids, talk with your doctor before you increase the amount of fluids you drink. · Urinate when you have the urge. Do not hold your urine for a long time. Urinate before you go to sleep. · If you have symptoms of a bladder infection, such as burning when you urinate or having to urinate often, call your doctor so you can treat the problem before it gets worse. If you do not treat a bladder infection quickly, it can spread to the kidney. · Men should keep the tip of the penis clean. If you are a woman, keep these ideas in mind:  · Urinate right after you have sex. · Change sanitary pads often. Avoid douches, feminine hygiene sprays, and other feminine hygiene products that have deodorants. · After going to the bathroom, wipe from front to back. When should you call for help? Call your doctor now or seek immediate medical care if:  · You have increasing pain in your back just below the rib cage. This is called flank pain. · You have a new or higher fever and chills. · You are vomiting or nauseated. Watch closely for changes in your health, and be sure to contact your doctor if:  · Symptoms, such as burning when you urinate, get worse or get better but then come back. · You are not getting better after 2 days. Where can you learn more? Go to http://roshni-shlomo.info/. Enter U387 in the search box to learn more about \"Kidney Infection: Care Instructions. \"  Current as of: November 28, 2016  Content Version: 11.2  © 8712-2489 [employer]. Care instructions adapted under license by [employer] (which disclaims liability or warranty for this information).  If you have questions about a medical condition or this instruction, always ask your healthcare professional. Shanta Magana disclaims any warranty or liability for your use of this information.

## 2017-05-30 NOTE — DISCHARGE SUMMARY
Hospitalist Discharge Summary     Patient ID:  Hilary Patterson  [id number]  89 y.o.  1965    PCP on record: None    Admit date: 5/28/2017  Discharge date and time: 5/30/2017      DISCHARGE DIAGNOSIS:  Sepsis, per CT report: possible left pyelonephritis and renal atrophy. Hypotension   Diarrhea, 5/29 c.diff results pending  NENA  Anemia  Hypokalemia, resolved with replacement  Alcohol abuse per admission note    CONSULTATIONS:  IP CONSULT TO UROLOGY    Excerpted HPI from H&P of Saskia Chand MD:  Hilary Patterson is a 54 y.o. female who presents with acute onset generalized body aches x 3 days. +fever of 101 yesterday. +assoc nausea and urinary frequency, no dysuria. Pt reports she went to [facility] today for these symptoms. At [facility] pt was diagnosed with an UTI and was transferred to [facility] ER because she was hypotensive. No SOB, no chest pain at home. +lack of appetite. She was taking aleve and advil at home for her symptoms. ______________________________________________________________________  DISCHARGE SUMMARY/HOSPITAL COURSE:  for full details see H&P, daily progress notes, labs, consult notes. Sepsis, per CT report: possible left pyelonephritis and renal atrophy. Hypotension and poor po intake.  - Dr. Branden Brady, urologist recommends her to continue with the [facility] and follow up with him as outpatient. Urine cx (-). Blood cx (-) so far.  Sepsis like symptoms due to unidentified organism  - hold BP medication due to hypotension; advised the patient to check with her pcp before start taking her antihypertensive medication  -currently afebrile and leukocytosis resolved  - we were not able to locate her records from outside of the hospital     Diarrhea, 5/29 c.diff results pending     NENA, resolved with IVF hydration and antibiotics      Anemia  - h/h stable and no s/s of acute blood loss     Hypokalemia, resolved with replacement  - kcl dupplement provided      Alcohol abuse per admission note  - no signs of DT      _______________________________________________________________________  Patient seen and examined by me on discharge day. Pertinent Findings:  Gen:    Not in distress  Chest: Clear lungs  CVS:   Regular rhythm. No edema  Abd:  Soft, not distended, not tender  Neuro:  Alert, orient x 4  _______________________________________________________________________  DISCHARGE MEDICATIONS:   Current Discharge Medication List      START taking these medications    Details   ciprofloxacin HCl (CIPRO) 250 mg tablet Take 2 Tabs by mouth every twelve (12) hours. Indications: UTI (noncomplicated)  Qty: 5 Tab, Refills: 0         STOP taking these medications       LISINOPRIL PO Comments:   Reason for Stopping:               My Recommended Diet, Activity, Wound Care, and follow-up labs are listed in the patient's Discharge Insturctions which I have personally completed and reviewed.     _______________________________________________________________________  DISPOSITION:    Home with Family: y   Home with HH/PT/OT/RN:    SNF/LTC:    DINESH:    OTHER:        Condition at Discharge:  Stable  _______________________________________________________________________  Follow up with:   PCP : None  Follow-up Information     Follow up With Details Comments Contact Info    Ramandeep Thomas MD Schedule an appointment as soon as possible for a visit to be seen as soon as possible [address] [address] [phone]                Total time in minutes spent coordinating this discharge (includes going over instructions, follow-up, prescriptions, and preparing report for sign off to her PCP) :  35 minutes    Signed:  Soni Saha, NP

## 2017-05-30 NOTE — PROGRESS NOTES
Pt given discharge instructions and hard script for medication. Time for question made. IV taken out and pt taken off of telemetry monitoring.

## 2017-05-30 NOTE — PROGRESS NOTES
[address] [facility] To whom it may concern:       RE: Minerva Perez (YOB: 1965)        Dear Eldon:    This is to certify that the above referenced patient was hospitalized at [facility] from 5/28/2017 through 5/30/2017. She is expected to go home and follow up with her Primary Care physician. It is recommended that Ms. Minerva Perez should be excused from work until May 31, 2017. She may return to work on June 1, 2017 without any restrictions. If you have questions please feel to contact the [facility] hospitalist office at [facility] at [phone].      Sincerely       ________________________________________   PIERCE Saucedo, FNP-C, APRN-BC  Hospitalist, [facility]   [address] [address] [address] [facility]  Tel: [phone]

## 2017-05-30 NOTE — PROGRESS NOTES
Bedside and Verbal shift change report given to Nancie Pérez (oncoming nurse) by Narciso Villa (offgoing nurse). Report included the following information SBAR, Kardex, Intake/Output, MAR and Recent Results. Zone Phone for oncoming shift:       Shift Summary: Patient rested quietly throughout the night. No C/O pain voiced. LDAs               Peripheral IV 05/29/17 Right Wrist (Active)   Site Assessment Clean, dry, & intact 5/30/2017  4:12 AM   Phlebitis Assessment 0 5/30/2017  4:12 AM   Infiltration Assessment 0 5/30/2017  4:12 AM   Dressing Status Clean, dry, & intact 5/30/2017  4:12 AM   Dressing Type Tape;Transparent 5/30/2017  4:12 AM   Hub Color/Line Status Yellow; Infusing 5/30/2017  4:12 AM                        Intake & Output   Date 05/29/17 0700 - 05/30/17 0659 05/30/17 0700 - 05/31/17 0659   Shift 0700-1859 1900-0659 24 Hour Total 0700-1859 1900-0659 24 Hour Total   I  N  T  A  K  E   P. O. 720  720         P. O. 720  720       I.V.  (mL/kg/hr) 50  (0.1)  50         Volume (cefTRIAXone (ROCEPHIN) 1 g in 0.9% sodium chloride (MBP/ADV) 50 mL) 50  50       Shift Total  (mL/kg) 770  (10.7)  770  (10.7)      O  U  T  P  U  T   Urine  (mL/kg/hr) 1200  (1.4)  1200         Urine Voided 1200  1200       Shift Total  (mL/kg) 1200  (16.7)  1200  (16.7)      NET -430  -430      Weight (kg) 71.7 71.7 71.7 71.7 71.7 71.7      Last Bowel Movement Last Bowel Movement Date: 05/29/17   Glucose Checks [x] N/A  [] AC/HS  [] Q6  Concerns:   Nutrition Active Orders   Diet    DIET GI LITE (POST SURGICAL)       Consults []PT  []OT  []Speech  []Case Management   Cardiac Monitoring []N/A [x]Yes Expires: 48 hours

## 2017-05-31 LAB
B BURGDOR IGM SER IA-ACNC: <0.8 INDEX (ref 0–0.79)
R RICKETTSI IGG SER QL IA: NEGATIVE

## 2017-06-04 LAB
BACTERIA SPEC CULT: NORMAL
SERVICE CMNT-IMP: NORMAL

## 2017-06-09 NOTE — PHYSICIAN ADVISORY
Dear Dr. Florina Montaño : The insurance company has denied the inpatient status of one of your patients. Now we need you to complete a [facility] and justify your clinical decision. Only you are allowed to do this peer to peer review. You will need to do the following:    Within next Three days    Please call [employer] at  [phone], then follow the prompt for medical management and then choose peer to peer review.  Talk to their staff and setup a peer to peer review with their doctor at a mutually convenient time    Prepare your argument to defend your decision on the basis of criteria I have provided for you - see below - next to [facility] (criteria)    Complete review with him/her    Send an email reply to me with   1. Name of doctor you did the peer to peer   2. Outcome (approved inpatient or denied)   3. Date you did the review   If you need help, you are more than welcome to talk to me anytime, call me on my cell number listed below     Policy Number :  [id number]    Reference/Auth Number Auth number: REF# [id number]     Pt Name:  Desean Hull   MR#  [id number]   CSN#   [id number]   [facility] date  5/28/2017  1:13 PM   Discharge date  5/30/2017   Discharging doctor  [id number] [facility] diagnosis  <principal problem not specified>        Insurance  Payor: [employer] / Plan: Treinta Y Rambo [id number] PPO / Product Type: PPO /      Glenice Darline    54 y.o. y.o. female who went to Urgent care type of facility prior to being sent to ER. There she was found hypotensive, tachycardiac; she received IVF , IV Abx. She was noted to Bilateral CVA tenderness. WBC there was noted at 16K   Pt was referred to [facility] ER and workup for sepsis completed. CT scan showed signs of pyelonephritis. Urine Cx was most likely false negative due to previous IV Rocephin Administration.       [place]  Pyelonephritis, Acute ORG: M-300 ([address] [place] [address])  Clinical Indications for Admission to Inpatient Care   · Hemodynamic instability  · Altered mental status that is severe or persistent  · Expand High fever or infection requiring inpatient admission  · Acute renal failure  · Need for IV hydration support (eg, inability to maintain oral hydration) despite outpatient and observation care treatment   Decision Attending to do peer to peer    Additional comments         Sincerely     Iain Dyer MD MPH FACP   [employer]   Cell: [phone]